# Patient Record
Sex: FEMALE | Race: WHITE
[De-identification: names, ages, dates, MRNs, and addresses within clinical notes are randomized per-mention and may not be internally consistent; named-entity substitution may affect disease eponyms.]

---

## 2017-04-22 ENCOUNTER — HOSPITAL ENCOUNTER (EMERGENCY)
Dept: HOSPITAL 62 - ER | Age: 52
Discharge: LEFT BEFORE DISCHARGE | End: 2017-04-22
Payer: MEDICARE

## 2017-04-22 DIAGNOSIS — Z53.21: Primary | ICD-10-CM
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
## 2020-10-13 NOTE — RADIOLOGY REPORT (SQ)
EXAM DESCRIPTION:  MRI CERVICAL SPINE WITHOUT; MRI THORACIC SPINE WITHOUT; MRI LUMBAR SPINE WITHOUT



IMAGES COMPLETED DATE/TIME:  10/13/2020 8:43 am



REASON FOR STUDY:  M47.814 SPONDYLOSIS W/O MYELOPATHY OR RADICULOPATHY, THORACIC REGION; Spondylosis 
Of lumbar spine (M47.816) M47.816  SPONDYLOSIS W/O MYELOPATHY OR RADICULOPATHY, LUMBAR  M47.814  SPON
DYLOSIS W/O MYELOPATHY OR RADICULOPATHY, THORACI



COMPARISON:  None.



TECHNIQUE:  Sagittal and Axial imaging includes T1, T2, STIR and gradient echo sequences. Coronal T2/
HASTE imaging.  Imaging is performed of the cervical, thoracic, lumbar spine.



LIMITATIONS:  None.



FINDINGS:  CERVICAL

ALIGNMENT:  Normal.

MARROW SIGNAL:  Normal.

CORD:  Normal.  No abnormal signal within.

C1-2:  No stenosis.

C2-3:  No stenosis.

C3-4:  No stenosis.

C4-5:  No stenosis.

C5-6:  No stenosis.

C6-7:  Disc height loss with disc bulge and central protrusion which contacts and minimally indents t
he ventral aspect of the cord.  No abnormal cord signal or high-grade central stenosis.

C7-T1:  No stenosis.

SOFT TISSUES:  No paraspinal mass or edema detected.

THORACIC:

ALIGNMENT:  Normal.

MARROW SIGNAL:  No worrisome lesions.  Several incidental hemangiomas in the lower thoracic spine.

CORD:  Normal.  Normal signal and caliber.

DISCS:  No significant disc bulges or disc hernias.  No suggestion of significant central or foramina
l stenosis.

SOFT TISSUES:  No paraspinal mass or edema.  No gross lung lesions.  No aortic aneurysm detected.

LUMBAR

VISUALIZED UPPER ABDOMEN:  Limited evaluation. No acute or suspicious findings suggested.

SEGMENTATION: No transitional anatomy. The lowest well-developed disc space is labeled L5-S1.

ALIGNMENT: Anatomic.

VERTEBRAE: Intact.

BONE MARROW: Normal. No marrow replacement or reactive changes.

DISC SIGNAL: Generally maintained disc heights.  Slight diminished disc signal at L4-5.

POSTERIOR ELEMENTS:  Minimal facet arthropathy without bulky overgrowth.  No pars defect.

HARDWARE: None in the spine.

CORD AND CONUS: Normal in size and signal intensity. Conus at the appropriate level.

SOFT TISSUES: No aortic aneurysm seen. No bulky retroperitoneal adenopathy or mass. No paraspinal mas
s or fluid.

L1-L2: No significant spinal stenosis or exit foraminal stenosis.

L2-L3: No significant spinal stenosis or exit foraminal stenosis.

L3-L4: No significant spinal stenosis or exit foraminal stenosis.

L4-L5: Minimal disc bulging without impingement.  Slight posterior element degenerative change withou
t significant central stenosis.  Minimal foraminal narrowing.

L5-S1: No significant spinal stenosis or exit foraminal stenosis.

SACRUM: Visualized upper sacrum intact.

OTHER: No other significant findings.



IMPRESSION:

1. C6-7 disc disease with minimal cord impingement but no high-grade stenosis or myelopathic signal o
n the cord.

2. Thoracic spine generally unremarkable.  Normal alignment without fracture or bone lesion or signif
icant stenosis.  Discs are maintained throughout.

3. Minimal lumbar spondylosis without stenosis.  No fracture or worrisome bone lesion.



TECHNICAL DOCUMENTATION:  JOB ID:  [id number]

 2011 [employer]- All Rights Reserved



Reading location - IP/workstation name: [email or web address]

## 2020-10-13 NOTE — RADIOLOGY REPORT (SQ)
EXAM DESCRIPTION:  MRI CERVICAL SPINE WITHOUT; MRI THORACIC SPINE WITHOUT; MRI LUMBAR SPINE WITHOUT



IMAGES COMPLETED DATE/TIME:  10/13/2020 8:43 am



REASON FOR STUDY:  M47.814 SPONDYLOSIS W/O MYELOPATHY OR RADICULOPATHY, THORACIC REGION; Spondylosis 
Of lumbar spine (M47.816) M47.816  SPONDYLOSIS W/O MYELOPATHY OR RADICULOPATHY, LUMBAR  M47.814  SPON
DYLOSIS W/O MYELOPATHY OR RADICULOPATHY, THORACI



COMPARISON:  None.



TECHNIQUE:  Sagittal and Axial imaging includes T1, T2, STIR and gradient echo sequences. Coronal T2/
HASTE imaging.  Imaging is performed of the cervical, thoracic, lumbar spine.



LIMITATIONS:  None.



FINDINGS:  CERVICAL

ALIGNMENT:  Normal.

MARROW SIGNAL:  Normal.

CORD:  Normal.  No abnormal signal within.

C1-2:  No stenosis.

C2-3:  No stenosis.

C3-4:  No stenosis.

C4-5:  No stenosis.

C5-6:  No stenosis.

C6-7:  Disc height loss with disc bulge and central protrusion which contacts and minimally indents t
he ventral aspect of the cord.  No abnormal cord signal or high-grade central stenosis.

C7-T1:  No stenosis.

SOFT TISSUES:  No paraspinal mass or edema detected.

THORACIC:

ALIGNMENT:  Normal.

MARROW SIGNAL:  No worrisome lesions.  Several incidental hemangiomas in the lower thoracic spine.

CORD:  Normal.  Normal signal and caliber.

DISCS:  No significant disc bulges or disc hernias.  No suggestion of significant central or foramina
l stenosis.

SOFT TISSUES:  No paraspinal mass or edema.  No gross lung lesions.  No aortic aneurysm detected.

LUMBAR

VISUALIZED UPPER ABDOMEN:  Limited evaluation. No acute or suspicious findings suggested.

SEGMENTATION: No transitional anatomy. The lowest well-developed disc space is labeled L5-S1.

ALIGNMENT: Anatomic.

VERTEBRAE: Intact.

BONE MARROW: Normal. No marrow replacement or reactive changes.

DISC SIGNAL: Generally maintained disc heights.  Slight diminished disc signal at L4-5.

POSTERIOR ELEMENTS:  Minimal facet arthropathy without bulky overgrowth.  No pars defect.

HARDWARE: None in the spine.

CORD AND CONUS: Normal in size and signal intensity. Conus at the appropriate level.

SOFT TISSUES: No aortic aneurysm seen. No bulky retroperitoneal adenopathy or mass. No paraspinal mas
s or fluid.

L1-L2: No significant spinal stenosis or exit foraminal stenosis.

L2-L3: No significant spinal stenosis or exit foraminal stenosis.

L3-L4: No significant spinal stenosis or exit foraminal stenosis.

L4-L5: Minimal disc bulging without impingement.  Slight posterior element degenerative change withou
t significant central stenosis.  Minimal foraminal narrowing.

L5-S1: No significant spinal stenosis or exit foraminal stenosis.

SACRUM: Visualized upper sacrum intact.

OTHER: No other significant findings.



IMPRESSION:

1. C6-7 disc disease with minimal cord impingement but no high-grade stenosis or myelopathic signal o
n the cord.

2. Thoracic spine generally unremarkable.  Normal alignment without fracture or bone lesion or signif
icant stenosis.  Discs are maintained throughout.

3. Minimal lumbar spondylosis without stenosis.  No fracture or worrisome bone lesion.



TECHNICAL DOCUMENTATION:  JOB ID:  [id number]

 2011 [employer]- All Rights Reserved



Reading location - IP/workstation name: [email or web address]

## 2020-10-13 NOTE — RADIOLOGY REPORT (SQ)
EXAM DESCRIPTION:  MRI CERVICAL SPINE WITHOUT; MRI THORACIC SPINE WITHOUT; MRI LUMBAR SPINE WITHOUT



IMAGES COMPLETED DATE/TIME:  10/13/2020 8:43 am



REASON FOR STUDY:  M47.814 SPONDYLOSIS W/O MYELOPATHY OR RADICULOPATHY, THORACIC REGION; Spondylosis 
Of lumbar spine (M47.816) M47.816  SPONDYLOSIS W/O MYELOPATHY OR RADICULOPATHY, LUMBAR  M47.814  SPON
DYLOSIS W/O MYELOPATHY OR RADICULOPATHY, THORACI



COMPARISON:  None.



TECHNIQUE:  Sagittal and Axial imaging includes T1, T2, STIR and gradient echo sequences. Coronal T2/
HASTE imaging.  Imaging is performed of the cervical, thoracic, lumbar spine.



LIMITATIONS:  None.



FINDINGS:  CERVICAL

ALIGNMENT:  Normal.

MARROW SIGNAL:  Normal.

CORD:  Normal.  No abnormal signal within.

C1-2:  No stenosis.

C2-3:  No stenosis.

C3-4:  No stenosis.

C4-5:  No stenosis.

C5-6:  No stenosis.

C6-7:  Disc height loss with disc bulge and central protrusion which contacts and minimally indents t
he ventral aspect of the cord.  No abnormal cord signal or high-grade central stenosis.

C7-T1:  No stenosis.

SOFT TISSUES:  No paraspinal mass or edema detected.

THORACIC:

ALIGNMENT:  Normal.

MARROW SIGNAL:  No worrisome lesions.  Several incidental hemangiomas in the lower thoracic spine.

CORD:  Normal.  Normal signal and caliber.

DISCS:  No significant disc bulges or disc hernias.  No suggestion of significant central or foramina
l stenosis.

SOFT TISSUES:  No paraspinal mass or edema.  No gross lung lesions.  No aortic aneurysm detected.

LUMBAR

VISUALIZED UPPER ABDOMEN:  Limited evaluation. No acute or suspicious findings suggested.

SEGMENTATION: No transitional anatomy. The lowest well-developed disc space is labeled L5-S1.

ALIGNMENT: Anatomic.

VERTEBRAE: Intact.

BONE MARROW: Normal. No marrow replacement or reactive changes.

DISC SIGNAL: Generally maintained disc heights.  Slight diminished disc signal at L4-5.

POSTERIOR ELEMENTS:  Minimal facet arthropathy without bulky overgrowth.  No pars defect.

HARDWARE: None in the spine.

CORD AND CONUS: Normal in size and signal intensity. Conus at the appropriate level.

SOFT TISSUES: No aortic aneurysm seen. No bulky retroperitoneal adenopathy or mass. No paraspinal mas
s or fluid.

L1-L2: No significant spinal stenosis or exit foraminal stenosis.

L2-L3: No significant spinal stenosis or exit foraminal stenosis.

L3-L4: No significant spinal stenosis or exit foraminal stenosis.

L4-L5: Minimal disc bulging without impingement.  Slight posterior element degenerative change withou
t significant central stenosis.  Minimal foraminal narrowing.

L5-S1: No significant spinal stenosis or exit foraminal stenosis.

SACRUM: Visualized upper sacrum intact.

OTHER: No other significant findings.



IMPRESSION:

1. C6-7 disc disease with minimal cord impingement but no high-grade stenosis or myelopathic signal o
n the cord.

2. Thoracic spine generally unremarkable.  Normal alignment without fracture or bone lesion or signif
icant stenosis.  Discs are maintained throughout.

3. Minimal lumbar spondylosis without stenosis.  No fracture or worrisome bone lesion.



TECHNICAL DOCUMENTATION:  JOB ID:  [id number]

 2011 [employer]- All Rights Reserved



Reading location - IP/workstation name: [email or web address]

## 2020-12-31 NOTE — WOMENS IMAGING REPORT
EXAM DESCRIPTION:  BILAT DIAGNOSTIC MAMMO W/CAD



IMAGES COMPLETED DATE/TIME:  12/31/2020 11:10 am



REASON FOR STUDY:  R92.2 INCONCLUSIVE MAMMOGRAM R92.2  INCONCLUSIVE MAMMOGRAM



COMPARISON:  7/30/2020 outside facility.



EXAM PARAMETERS:  Standard craniocaudal and mediolateral oblique views of each breast recorded using 
digital acquisition.

True lateral view both breasts.

Read with the assistance of CAD:

.[facility] - [employer]  Version 9.2



LIMITATIONS:  None.



FINDINGS:  RIGHT BREAST

MASSES: No suspicious masses.

CALCIFICATIONS: No new or suspicious calcifications.

ARCHITECTURAL DISTORTION: None.

ASYMMETRY: None noted.

OTHER: No other significant findings.

LEFT BREAST

MASSES: No suspicious masses.

CALCIFICATIONS: No new or suspicious calcifications.

ARCHITECTURAL DISTORTION: None.

ASYMMETRY: None noted.

OTHER: No other significant finding.



IMPRESSION:  No evidence of malignancy.



BREAST DENSITY:  b. There are scattered areas of fibroglandular density.



BIRAD:  ASSESSMENT:  0 Incomplete: Needs additional imaging evaluation and/or prior mammograms for co
mparison.



RECOMMENDATION:  RECOMMENDED FOLLOW UP: Patient was referred for 6 month short-term mammogram and 6 m
[facility] short-term ultrasound follow-up of the left breast.

SPECIFIC INTERVENTION/IMAGING/CONSULTATION RECOMMENDED:Ultrasound of the left breast.

COMMUNICATION:The imaging findings were not discussed with the patient. Her referring provider has be
en notified of the findings.



COMMENT:  The patient has been notified of the results by letter per [facility] requirements. Additional no
tification policies are in place for contacting patient with suspicious or incomplete findings.

Quality ID #225: The American College of Radiology recommends an annual screening mammogram for women
 aged 40 years or over. This facility utilizes a reminder system to ensure that all patients receive 
reminder letters, and/or direct phone calls for appointments. This includes reminders for routine scr
eening mammograms, diagnostic mammograms, or other Breast Imaging Interventions when appropriate.  Th
is patient will be placed in the appropriate reminder system.



TECHNICAL DOCUMENTATION:  FINDING NUMBER: (1)

ASSESSMENT: (1)

JOB ID:  [id number]

 2011 [employer]- All Rights Reserved



Reading location - IP/workstation name: [email or web address]

## 2021-01-11 NOTE — WOMENS IMAGING REPORT
EXAM DESCRIPTION:  U/S BREAST UNILATERAL, COMPL



IMAGES COMPLETED DATE/TIME:  1/8/2021 11:56 am



REASON FOR STUDY:  R92.8 R92.2  INCONCLUSIVE MAMMOGRAM



COMPARISON:  None.



TECHNIQUE:  Real-time and static grayscale imaging performed of the left breast targeted to the area 
of clinical/mammographic concern. Selected color Doppler images recorded.



LIMITATIONS:  None.



FINDINGS:  MASS: The left breast was scanned from the 12 to 12 o'clock axes.  Ductal ectasia posterio
r to the nipple.

OTHER: No other significant finding.



IMPRESSION:  1. No suspicious findings detected by ultrasound.



BIRAD:  2 Benign findings.



RECOMMENDATION:  1. Follow-up as clinically indicated.



COMMENT:  The American College of Radiology (ACR) has developed recommendations for screening MRI of 
the breasts in certain patient populations, to be used in conjunction with mammography.  Breast MRI s
urveillance may be appropriate for women with more than 20% lifetime risk of developing breast cancer
  as determined by genetic testing, significant family history of the disease, or history of mantle r
adiation for Hodgkins Disease.  ACR Practice Guidelines 2008.



TECHNICAL DOCUMENTATION:  JOB ID:  [id number]

 2011 [employer]- All Rights Reserved



Reading location - IP/workstation name: [email or web address]